# Patient Record
Sex: FEMALE | Race: OTHER
[De-identification: names, ages, dates, MRNs, and addresses within clinical notes are randomized per-mention and may not be internally consistent; named-entity substitution may affect disease eponyms.]

---

## 2019-08-29 ENCOUNTER — HOSPITAL ENCOUNTER (OUTPATIENT)
Dept: HOSPITAL 76 - SDS | Age: 44
Discharge: HOME | End: 2019-08-29
Attending: OBSTETRICS & GYNECOLOGY
Payer: COMMERCIAL

## 2019-08-29 VITALS — SYSTOLIC BLOOD PRESSURE: 121 MMHG | DIASTOLIC BLOOD PRESSURE: 82 MMHG

## 2019-08-29 DIAGNOSIS — N73.6: ICD-10-CM

## 2019-08-29 DIAGNOSIS — R10.2: Primary | ICD-10-CM

## 2019-08-29 DIAGNOSIS — N80.3: ICD-10-CM

## 2019-08-29 PROCEDURE — 0U5F4ZZ DESTRUCTION OF CUL-DE-SAC, PERCUTANEOUS ENDOSCOPIC APPROACH: ICD-10-PCS | Performed by: OBSTETRICS & GYNECOLOGY

## 2019-08-29 PROCEDURE — 0DNW4ZZ RELEASE PERITONEUM, PERCUTANEOUS ENDOSCOPIC APPROACH: ICD-10-PCS | Performed by: OBSTETRICS & GYNECOLOGY

## 2019-08-29 PROCEDURE — 58662 LAPAROSCOPY EXCISE LESIONS: CPT

## 2019-08-29 NOTE — ANESTHESIA
Pre-Anesthesia VS, & Labs





- Diagnosis





chronic pelvic pain





- Procedure





diagnostic laparoscopy


Vital Signs: 





                                        











Temp Pulse Resp BP Pulse Ox


 


 36.2 C L  71   16   106/75   96 


 


 19 07:39  19 07:39  19 07:39  19 07:39  19 07:39














                                        





Height                           5 ft 3.78 in


Weight (kg)                      77.4 kg











- NPO


>8 hours


Last Fluid Intake: h20 at 0630





- Pregnancy


Is Patient Pregnant?: Waiver signed





Home Medications and Allergies


Home Medications: 


Ambulatory Orders





Diclofenac Sodium [Voltaren] 100 gm TP 19 


Ergocalciferol [Vitamin D2] 50,000 unit PO Q7D 19 


Fluticasone [Flonase] 1 sprays TATI DAILY 19 


Loratadine [Claritin] 10 mg PO 19 


Omeprazole 40 mg PO 19 


Zolpidem [Ambien] 5 mg PO HS 19 











                                        





Ibuprofen  14 


Diclofenac Sodium [Voltaren] 100 gm TP 19 


Ergocalciferol [Vitamin D2] 50,000 unit PO Q7D 19 


Fluticasone [Flonase] 1 sprays TATI DAILY 19 


Loratadine [Claritin] 10 mg PO 19 


Omeprazole 40 mg PO 19 


Zolpidem [Ambien] 5 mg PO HS 19 








Allergies/Adverse Reactions: 


                                    Allergies











Allergy/AdvReac Type Severity Reaction Status Date / Time


 


amoxicillin Allergy  Rash Verified 19 14:23














Anes History & Medical History





- Anesthetic History


Anesthesia Complications: reports: Post-Operative Nausea/Vomiting





- Medical History


Cardiovascular: reports: None


Pulmonary: reports: None


Gastrointestinal: reports: GERD (controlled with medication)


Urinary: reports: None


Neuro: reports: None


Musculoskeletal: reports: None


Endocrine/Autoimmune: reports: None


Blood Disorders: reports: None


Skin: reports: None


Smoking Status: Never smoker


Psychosocial: reports: No issues indicated





- Surgical History


General: Cholecystectomy


Gynecologic:  section, Tubal ligation





Exam


General: Alert, Oriented x3, Cooperative, No acute distress


Dental: WNL


Mouth Opening: 3 Fingerbreadth


Neck Mobility: Normal


Mallampati classification: II


Thyromental Distance: greater than 6 cm


Respiratory: Lungs clear, Normal breath sounds, No respiratory distress, No 

accessory muscle use


Cardiovascular: Regular rate, Normal S1, Normal S2, No murmurs


Mental/Cognitive Status: Alert/Oriented X3, Normal for patient





Plan


Anesthesia Type: General


Consent for Procedure(s) Verified and Reviewed: Yes


Code Status: Attempt Resuscitation


ASA classification: 2-Mild systemic disease


Is this case an emergency?: No

## 2019-08-29 NOTE — OPERATIVE REPORT
Operative Report





- General


Procedure Date: 08/29/19


Planned Procedure: 





Diagnostic laparoscopy


Pre-Op Diagnosis: Chronic pelvic pain


Procedure Performed: 





Diagnostic laparoscopy


Lysis of adhesions 


Fulguration of endometriosis


Post Op Diagnosis: Adhesions





- Procedure Note


Primary Surgeon: Dory


Anesthesia Provider: Rose


Anesthesia Technique: General ET tube


IV Fluids (mL): 1,000


Estimated Blood Loss (mL): 10


Indications: 





7-year history of pelvic pain of uncertain etiology


Findings: 





Exam under anesthesia


Uterus was of normal size shape and consistency and anteverted.  There were no 

adnexal masses.





Operative findings the uterus was anteverted and of normal size and shape.  

There was a dense adhesive band from the fundus of the uterus to the anterior 

abdominal wall.  There were filmy omental adhesions in the right lower quadrant 

omentum covered the entire abdomen obscuring abdominal contents and preventing 

observation of the appendix and liver.  There were 3 endometriotic implants in 

the posterior cul-de-sac.  The anterior cul-de-sac and both ovarian fossae were 

clear.  Both tubes and ovaries appeared normal.


Complications: 





None





- Other


Other Information/Narrative: 





The patient was taken to the operating room, where general endotracheal 

anesthesia was administered without difficulty.  She was then positioned in the 

low dorsal lithotomy position with her lower extremities in Yellow Fin stirrups.

 Vagina, perineum, and abdomen were then prepped and draped in a sterile 

fashion.  Procedure Time-Out was then performed. An exam under anesthesia was 

performed.  A sterile bivalve speculum was then inserted into the vagina.  The 

anterior lip of the cervix was grasped with a single-tooth tenaculum, then the 

cervix was serially dilated until a Hulka uterine manipulator could be advanced.

 





The speculum was then removed, and attention was turned to the laparoscopy.  

0.5% Marcaine was injected infraumbilically, then a 7-mm horizontal skin 

incision made.  A Verres needle was then inserted through the anterior layers of

the abdominal wall with saline drop test suggesting intraperitoneal placement.  

Carbon dioxide gas insufflation was then performed with appropriate opening 

pressures noted.  Once 2 L of gas was instilled, a 0-degree, 5 mm laparoscope 

was inserted into a 5 mm trocar and passed through the anterior layers of the 

abdominal wall using Optiview technique.  The abdomen was visualized, then a  

right lower quadrant port was placed, first instilling local anesthetic, then 

placing the ports under direct visualization with the laparoscope.  





The patient was placed into Trendelenberg and bowel swept out of the cul-de-sac.

 The pelvis was visualized with the findings as noted above.  The fibrous 

adhesion from the fundus of the uterus to the anterior abdominal wall was doubly

coagulated with the LigaSure and divided. The right lower quadrant adhesions 

were taken down with the LigaSure. Bovie cautery was utilized to fulgurate the 

endometriosis lesions.  At this point the laparoscopy was deemed complete, and 

all trocars were removed from the abdomen.  The carbon dioxide gas was then 

allowed to escape.  The incisions were then closed with 4-0 Monocryl in a 

subcuticular fashion followed by Dermabond skin adhesive.  The uterine 

manipulator was removed.  At this point the procedure was deemed complete.





The patient was then replaced supine, awakened, extubated, and transferred to 

the PACU in stable condition.  There were no complications.  Sponge, lap, and 

needle count were correct x 3.  There were no complications.

## 2019-10-04 ENCOUNTER — HOSPITAL ENCOUNTER (OUTPATIENT)
Dept: HOSPITAL 76 - SDS | Age: 44
Discharge: HOME | End: 2019-10-04
Attending: ORTHOPAEDIC SURGERY
Payer: COMMERCIAL

## 2019-10-04 VITALS — DIASTOLIC BLOOD PRESSURE: 74 MMHG | SYSTOLIC BLOOD PRESSURE: 114 MMHG

## 2019-10-04 DIAGNOSIS — K21.9: ICD-10-CM

## 2019-10-04 DIAGNOSIS — Z79.82: ICD-10-CM

## 2019-10-04 DIAGNOSIS — M25.372: Primary | ICD-10-CM

## 2019-10-04 LAB
HCG UR QL: NEGATIVE
SP GR UR STRIP.AUTO: 1.01 (ref 1–1.03)

## 2019-10-04 PROCEDURE — 81025 URINE PREGNANCY TEST: CPT

## 2019-10-04 PROCEDURE — 0MQR0ZZ REPAIR LEFT ANKLE BURSA AND LIGAMENT, OPEN APPROACH: ICD-10-PCS | Performed by: ORTHOPAEDIC SURGERY

## 2019-10-04 PROCEDURE — 27698 REPAIR OF ANKLE LIGAMENT: CPT

## 2019-10-04 NOTE — ANESTHESIA
Pre-Anesthesia VS, & Labs





- Diagnosis





left ankle instability





- Procedure





left ankle brostrum procedure, peroneal repair


Vital Signs: 





                                        











Temp Pulse Resp BP Pulse Ox


 


 36.2 C L  78   16   117/85 H  97 


 


 10/04/19 06:36  10/04/19 06:36  10/04/19 06:36  10/04/19 06:36  10/04/19 06:36














                                        





Height                           5 ft 4 in


Weight (kg)                      77 kg











- Pregnancy


Is Patient Pregnant?: No





Home Medications and Allergies





                                        





Ibuprofen  14 


Diclofenac Sodium [Voltaren] 100 gm TP 19 


Ergocalciferol [Vitamin D2] 50,000 unit PO Q7D 19 


Fluticasone [Flonase] 1 sprays TATI DAILY 19 


Loratadine [Claritin] 10 mg PO 19 


Omeprazole 40 mg PO 19 


Zolpidem [Ambien] 5 mg PO HS 19 








Allergies/Adverse Reactions: 


                                    Allergies











Allergy/AdvReac Type Severity Reaction Status Date / Time


 


amoxicillin Allergy  Rash Verified 19 14:23














Anes History & Medical History





- Anesthetic History


Anesthesia Complications: reports: No previous complications


Family history of Anesthesia Complications: Denies


Family history of Malignant Hyperthermia: Denies





- Medical History


Cardiovascular: reports: None


Pulmonary: reports: None


Gastrointestinal: reports: GERD


Urinary: reports: None


Neuro: reports: None


Musculoskeletal: reports: Osteoarthritis


Endocrine/Autoimmune: reports: None


Blood Disorders: reports: None


Skin: reports: None


Smoking Status: Never smoker





- Surgical History


General: Cholecystectomy


Gynecologic:  section





Results





- Other


Other Results/Comments: 





HCG negative





Exam


Mouth Opening: 3 Fingerbreadth


Mallampati classification: IV


Thyromental Distance: 4-6 cm


Respiratory: Lungs clear


Cardiovascular: Regular rate





Plan


Anesthesia Type: General


Consent for Procedure(s) Verified and Reviewed: Yes


Code Status: Attempt Resuscitation


ASA classification: 2-Mild systemic disease


Is this case an emergency?: No

## 2019-10-04 NOTE — OPERATIVE REPORT
Operative Report





- Other


Other Information/Narrative: 


Date of Surgery: 4 October 2019





Pre-Op Diagnosis: Left ankle instability.  Symptomatic peroneal tendon disease





Procedure: Left ankle lateral ligament repair.  Peroneal tendon debridement





Postop Diagnosis: Same





Primary Surgeon: Elliot Gonsalez





Secondary Surgeon: Kashmir Kulkarni





Complications: None





Tourniquet Time: 59 minutes





EBL: 1 cc





Implants: Arthrex fibertak x 2





Postoperative Protocol:


Same day surgery discharge


Splint nonweightbearing until 2 weeks


At 2 weeks the splint will be removed, sutures will be removed, and she will be 

placed in a boot or a cast.


At 6 weeks he will be allowed to start walking with a boot on


At 12 weeks he can wean from the boot and advance activities as tolerated





Indication For Surgery: 43-year-old female with chronic left ankle instability 

and pain.  The pain is located over the peroneal tendons and on MRI she had an 

accessory muscle.  The instability did not respond to bracing and physical 

therapy.  She desired operative management.  The risks, benefits, and 

alternatives were discussed.  Risks include pain, bleeding, infection, damage to

nearby structures, numbness, lack of symptom relief, implant complications, 

nonunion, need for further surgery, DVT, PE, stroke, and death.  Written consent

was obtained.





Procedure in Detail: The patient was met in the pre-operative hold area on the 

day of the procedure.  The operative extremity was signed and questions were 

answered.  The patient was brought to the operating room and a general 

anesthetic was administered.  Supine position was used and all bony prominences 

were padded.  Standard prepping and draping was performed.  A time out confirmed

patient identification, laterality, procedure, allergies, antibiotics, and 

images.  An Esmarch was used to exsanguinate the limb and the tourniquet was 

elevated to 250 mmHg.  





A 6 cm incision was made over the lateral ankle coursing in line with the fibers

of the ATFL.  Incision was carried down to the level of the retinaculum. Full-

thickness skin flaps were then elevated anteriorly as well as posteriorly.  The 

peroneal tendon sheath was identified and opened sharply.  I took care to not 

take down the retinaculum distally.  I bluntly dissected between the peroneus 

brevis and peroneus longus and performed a Gladis synovectomy of both tendons.  

Both were pulled out of the wound for inspection.  There were no tears in the 

peroneus longus or brevis tendons.  An accessory peroneus quartus was seen deep 

within the peroneal tendon sheath.  This was sharply dissected off of the 

surrounding tissues and excised.  The wound was irrigated.





I then turned my attention to the lateral ligaments.  The capsule anterior and 

inferior to the fibular tip was opened 2 mm off of the fibula leaving a small 

cuff.  A blunt instrument was placed into the joint along the front end of the 

fibula and the capsule was opened.  The peroneal tendons were identifed and 

protected.  The fibula cuff was then elevated subperiosteally.  The joint was 

inspected and no cartilage lesions were appreciated.  [place]'s ligament was 

excised with a narrow rondure.  The anterior distal fibula bone was then 

prepped, creating a small trough using a rongeur as well as a curet.  Two 

anchors were then inserted, the first at the CFL footprint and the second at the

ATFL footprint.  The limb was elevated from the table via the suture anchors to 

ensure secure fixation.  The CFL suture anchor was then passed distally through 

the CFL and capsule and pulled taut. The ATFL suture was then passed  in similar

fashion through the ATFL tissue and capsule in the anatomic location.   All 

limbs were then passed approximately from deep to superficial through the 

periosteal layer on the fibula.  The ankle was placed in neutral dorsiflexion 

and slight hindfoot valgus and the sutures were tied.  The sutures were then 

passed distally through the extensor retinaculum to perform the Narvaez 

modification and were again tied.  This restored stability nicely.  The sutures 

were cut. The ankle was then tested and noted to be stable to anterior drawer te

sting as well as to talar tilt testing.  The wound was then irrigated copiously.





Closure was then conducted using 0 Vicryl in a figure-of-eight fashion for the 

deep tissue layer followed 2-0 vicryl in buried interrupted fashion for the deep

dermal layer. The skin was then closed using 3-0 nylon in a mattress suture 

fashion.  10 mL's of 0.25% Marcaine was injected into the periwound soft tissue 

.  The wounds were then dressed with Xeroform, plain 4x4 gauze, and wrapped in 

sterile Webril. They were then placed in a L and U splint at neutral 

dorsiflexion and approximately 5 degrees of eversion, awakened from general 

anesthesia without complication, brought to the Postanesthesia Care Unit for 

further Recovery.